# Patient Record
Sex: MALE | Race: WHITE | URBAN - METROPOLITAN AREA
[De-identification: names, ages, dates, MRNs, and addresses within clinical notes are randomized per-mention and may not be internally consistent; named-entity substitution may affect disease eponyms.]

---

## 2024-11-04 ENCOUNTER — OFFICE VISIT (OUTPATIENT)
Dept: INTERNAL MEDICINE CLINIC | Age: 64
End: 2024-11-04

## 2024-11-04 VITALS
RESPIRATION RATE: 18 BRPM | HEART RATE: 72 BPM | SYSTOLIC BLOOD PRESSURE: 124 MMHG | DIASTOLIC BLOOD PRESSURE: 72 MMHG | WEIGHT: 204.3 LBS | OXYGEN SATURATION: 99 %

## 2024-11-04 DIAGNOSIS — R63.1 POLYDIPSIA: ICD-10-CM

## 2024-11-04 DIAGNOSIS — E78.00 ELEVATED LDL CHOLESTEROL LEVEL: ICD-10-CM

## 2024-11-04 DIAGNOSIS — Z13.1 SCREENING FOR DIABETES MELLITUS: ICD-10-CM

## 2024-11-04 DIAGNOSIS — Z12.5 SCREENING FOR MALIGNANT NEOPLASM OF PROSTATE: ICD-10-CM

## 2024-11-04 DIAGNOSIS — Z12.11 SCREENING FOR MALIGNANT NEOPLASM OF COLON: ICD-10-CM

## 2024-11-04 DIAGNOSIS — Z00.00 ENCOUNTER FOR MEDICAL EXAMINATION TO ESTABLISH CARE: Primary | ICD-10-CM

## 2024-11-04 LAB
A/G RATIO: 2.4 RATIO (ref 0.8–2.6)
ALBUMIN: 4.3 G/DL (ref 3.5–5.2)
ALP BLD-CCNC: 126 U/L (ref 23–144)
ALT SERPL-CCNC: 18 U/L (ref 0–60)
AST SERPL-CCNC: 26 U/L (ref 0–55)
BASOPHILS ABSOLUTE: 0 K/UL (ref 0–0.3)
BASOPHILS RELATIVE PERCENT: 0.6 % (ref 0–2)
BILIRUB SERPL-MCNC: 1.1 MG/DL (ref 0–1.2)
BUN / CREAT RATIO: 8 (ref 7–25)
BUN BLDV-MCNC: 6 MG/DL (ref 3–29)
CALCIUM SERPL-MCNC: 9 MG/DL (ref 8.5–10.5)
CHLORIDE BLD-SCNC: 101 MEQ/L (ref 96–110)
CHOLESTEROL, TOTAL: 193 MG/DL
CO2: 27 MEQ/L (ref 19–32)
CREAT SERPL-MCNC: 0.8 MG/DL (ref 0.5–1.2)
DIFFERENTIAL COUNT: NORMAL
EOSINOPHILS ABSOLUTE: 0.1 K/UL (ref 0–0.5)
EOSINOPHILS RELATIVE PERCENT: 0.9 % (ref 0–5)
ESTIMATED GLOMERULAR FILTRATION RATE CREATININE EQUATION: 98 MLS/MIN/1.73M2
FASTING STATUS: ABNORMAL
GLOBULIN: 1.8 G/DL (ref 1.9–3.6)
GLUCOSE BLD-MCNC: 87 MG/DL (ref 70–99)
HBA1C MFR BLD: 5 %
HCT VFR BLD CALC: 42 % (ref 37.5–51)
HDLC SERPL-MCNC: 65 MG/DL
HEMOGLOBIN: 14.4 G/DL (ref 13–17.7)
IMMATURE GRANS (ABS): 0 K/UL (ref 0–0.1)
IMMATURE GRANULOCYTES %: 0.2 %
LDL CHOLESTEROL: 120 MG/DL
LYMPHOCYTES ABSOLUTE: 1.1 K/UL (ref 0.9–4.1)
LYMPHOCYTES RELATIVE PERCENT: 21.3 % (ref 14–51)
MCH RBC QN AUTO: 31.6 PG (ref 26–34)
MCHC RBC AUTO-ENTMCNC: 34.3 G/DL (ref 30.7–35.5)
MCV RBC AUTO: 92.1 FL (ref 80–100)
MONOCYTES ABSOLUTE: 0.5 K/UL (ref 0.2–1)
MONOCYTES RELATIVE PERCENT: 9.2 % (ref 4–12)
NEUTROPHILS ABSOLUTE: 3.6 K/UL (ref 1.8–7.5)
NEUTROPHILS RELATIVE PERCENT: 67.8 % (ref 42–80)
PDW BLD-RTO: 11.8 %
PLATELET # BLD: 183 K/UL (ref 140–400)
PMV BLD AUTO: 10.2 FL (ref 7.2–11.7)
POTASSIUM SERPL-SCNC: 4.7 MEQ/L (ref 3.4–5.3)
PROSTATE SPECIFIC ANTIGEN: 2.73 NG/ML
RBC # BLD: 4.56 M/UL (ref 4.14–5.8)
RETICULOCYTE ABSOLUTE COUNT: 0 /100 WBC
SODIUM BLD-SCNC: 137 MEQ/L (ref 135–148)
TOTAL PROTEIN: 6.1 G/DL (ref 6–8.3)
TRIGL SERPL-MCNC: 42 MG/DL
TSH ULTRASENSITIVE: 1.77 MCIU/ML (ref 0.4–4.5)
VLDLC SERPL CALC-MCNC: 8 MG/DL (ref 4–38)
WBC # BLD: 5.3 K/UL (ref 3.5–10.9)

## 2024-11-04 RX ORDER — RIBOFLAVIN (VITAMIN B2) 100 MG
TABLET ORAL DAILY
COMMUNITY

## 2024-11-04 SDOH — ECONOMIC STABILITY: INCOME INSECURITY: HOW HARD IS IT FOR YOU TO PAY FOR THE VERY BASICS LIKE FOOD, HOUSING, MEDICAL CARE, AND HEATING?: NOT HARD AT ALL

## 2024-11-04 SDOH — ECONOMIC STABILITY: FOOD INSECURITY: WITHIN THE PAST 12 MONTHS, YOU WORRIED THAT YOUR FOOD WOULD RUN OUT BEFORE YOU GOT MONEY TO BUY MORE.: NEVER TRUE

## 2024-11-04 SDOH — ECONOMIC STABILITY: FOOD INSECURITY: WITHIN THE PAST 12 MONTHS, THE FOOD YOU BOUGHT JUST DIDN'T LAST AND YOU DIDN'T HAVE MONEY TO GET MORE.: NEVER TRUE

## 2024-11-04 ASSESSMENT — PATIENT HEALTH QUESTIONNAIRE - PHQ9
SUM OF ALL RESPONSES TO PHQ QUESTIONS 1-9: 0
1. LITTLE INTEREST OR PLEASURE IN DOING THINGS: NOT AT ALL
SUM OF ALL RESPONSES TO PHQ9 QUESTIONS 1 & 2: 0
SUM OF ALL RESPONSES TO PHQ QUESTIONS 1-9: 0
2. FEELING DOWN, DEPRESSED OR HOPELESS: NOT AT ALL
DEPRESSION UNABLE TO ASSESS: FUNCTIONAL CAPACITY MOTIVATION LIMITS ACCURACY
SUM OF ALL RESPONSES TO PHQ QUESTIONS 1-9: 0
SUM OF ALL RESPONSES TO PHQ QUESTIONS 1-9: 0

## 2024-11-04 ASSESSMENT — ENCOUNTER SYMPTOMS
WHEEZING: 0
SINUS PRESSURE: 0
NAUSEA: 0
CONSTIPATION: 0
ABDOMINAL DISTENTION: 0
COUGH: 0
COLOR CHANGE: 0
EYES NEGATIVE: 1
DIARRHEA: 0
ABDOMINAL PAIN: 0
SHORTNESS OF BREATH: 0
SORE THROAT: 0
CHEST TIGHTNESS: 0
SINUS PAIN: 0

## 2024-11-04 NOTE — PROGRESS NOTES
Colby Cai   64 y.o.  male  [id number]      Chief Complaint   Patient presents with    New Patient    Dry Eye     Increased thirst    Knee Pain     Left knee         Subjective:  Patient is here to establish care.  Patient has a history of none and current complaints are as below.  Health maintenance reviewed with patient.  Patient does not smoke.  Patient  does not use drugs.    Overall, the patient's only mild concern for his health today is in regards to chronic increased thirst.  After review of records, this appears this has been evaluated and worked up in the past with essentially no clear etiology for his symptoms.  His sugar levels have never trended in diabetic nor prediabetic range.  He reports a healthy diet and routine exercise.  Denies any polyuria or previous issues with hyper or hypoglycemic events.    Did have mildly elevated low-density lipoprotein 126 last year through his provider at [facility].    Patient's past medical, surgical, social and/or family history reviewed, updated in chart..  Medications and allergies also reviewed and updatedin chart.      -PSA last year was normal at 2.14. Due for annual screening  -agreeable to screening colonoscopy    Review of Systems   Constitutional:  Negative for activity change, appetite change, fatigue and fever.   HENT:  Negative for congestion, sinus pressure, sinus pain and sore throat.    Eyes: Negative.    Respiratory:  Negative for cough, chest tightness, shortness of breath and wheezing.    Cardiovascular:  Negative for chest pain and palpitations.   Gastrointestinal:  Negative for abdominal distention, abdominal pain, constipation, diarrhea and nausea.   Endocrine: Positive for polydipsia.   Genitourinary:  Negative for difficulty urinating, dysuria, flank pain, frequency and hematuria.   Musculoskeletal:  Negative for arthralgias, gait problem, joint swelling and myalgias.   Skin:  Negative for color change and rash.   Neurological:

## 2024-11-05 ENCOUNTER — PATIENT MESSAGE (OUTPATIENT)
Dept: INTERNAL MEDICINE CLINIC | Age: 64
End: 2024-11-05

## 2024-11-05 DIAGNOSIS — Z00.00 ENCOUNTER FOR MEDICAL EXAMINATION TO ESTABLISH CARE: ICD-10-CM

## 2024-11-05 DIAGNOSIS — R63.1 POLYDIPSIA: ICD-10-CM

## 2024-11-05 DIAGNOSIS — Z13.1 SCREENING FOR DIABETES MELLITUS: Primary | ICD-10-CM

## 2024-11-12 LAB
BILIRUBIN, URINE: NEGATIVE MG/DL
CLARITY, UA: CLEAR
COLOR, UA: YELLOW
GLUCOSE URINE: NEGATIVE MG/DL
KETONES, URINE: NEGATIVE MG/DL
LEUKOCYTE ESTERASE, URINE: NEGATIVE
NITRITE, URINE: NEGATIVE
PH, URINE: 7 (ref 4.5–8)
PROTEIN, URINE: NEGATIVE MG/DL
SPECIFIC GRAVITY UA: 1.01 (ref 1–1.03)
URINE HGB: NEGATIVE MG/DL
UROBILINOGEN, URINE: <2 MG/DL (ref 0–2)